# Patient Record
Sex: FEMALE | URBAN - METROPOLITAN AREA
[De-identification: names, ages, dates, MRNs, and addresses within clinical notes are randomized per-mention and may not be internally consistent; named-entity substitution may affect disease eponyms.]

---

## 2021-01-12 ENCOUNTER — OFFICE VISIT (OUTPATIENT)
Dept: URBAN - METROPOLITAN AREA CLINIC 113 | Facility: CLINIC | Age: 31
End: 2021-01-12
Payer: COMMERCIAL

## 2021-01-12 ENCOUNTER — WEB ENCOUNTER (OUTPATIENT)
Dept: URBAN - METROPOLITAN AREA CLINIC 13 | Facility: CLINIC | Age: 31
End: 2021-01-12

## 2021-01-12 ENCOUNTER — WEB ENCOUNTER (OUTPATIENT)
Dept: URBAN - METROPOLITAN AREA CLINIC 113 | Facility: CLINIC | Age: 31
End: 2021-01-12

## 2021-01-12 ENCOUNTER — LAB OUTSIDE AN ENCOUNTER (OUTPATIENT)
Dept: URBAN - METROPOLITAN AREA CLINIC 113 | Facility: CLINIC | Age: 31
End: 2021-01-12

## 2021-01-12 VITALS
WEIGHT: 82 LBS | SYSTOLIC BLOOD PRESSURE: 98 MMHG | HEIGHT: 55 IN | HEART RATE: 72 BPM | BODY MASS INDEX: 18.97 KG/M2 | RESPIRATION RATE: 18 BRPM | DIASTOLIC BLOOD PRESSURE: 53 MMHG | TEMPERATURE: 98.4 F

## 2021-01-12 DIAGNOSIS — R16.0 LIVER MASS: ICD-10-CM

## 2021-01-12 DIAGNOSIS — R79.89 ELEVATED LIVER FUNCTION TESTS: ICD-10-CM

## 2021-01-12 DIAGNOSIS — Q87.19 NOONAN SYNDROME: ICD-10-CM

## 2021-01-12 DIAGNOSIS — Z86.2 HISTORY OF IRON DEFICIENCY ANEMIA: ICD-10-CM

## 2021-01-12 PROCEDURE — G9903 PT SCRN TBCO ID AS NON USER: HCPCS | Performed by: INTERNAL MEDICINE

## 2021-01-12 PROCEDURE — 99204 OFFICE O/P NEW MOD 45 MIN: CPT | Performed by: INTERNAL MEDICINE

## 2021-01-12 PROCEDURE — G8420 CALC BMI NORM PARAMETERS: HCPCS | Performed by: INTERNAL MEDICINE

## 2021-01-12 PROCEDURE — G8484 FLU IMMUNIZE NO ADMIN: HCPCS | Performed by: INTERNAL MEDICINE

## 2021-01-12 PROCEDURE — G8427 DOCREV CUR MEDS BY ELIG CLIN: HCPCS | Performed by: INTERNAL MEDICINE

## 2021-01-12 NOTE — HPI-TODAY'S VISIT:
Very pleasant30-year-old female presents with a history of a liver lesion identified during her workup for a possible uterine mass.  The CT scan revealed a 1.6 cm lesion in the dome of her liver.  Also she was found to have a slight elevation of her AST.  She doesn't have any significant GI symptoms.  No complaints of dysphagia heartburn or regurgitation.  No abdominal pain.  No diarrhea or constipation.  No rectal bleeding or melena.  No pre-existing history of liver disease.  She does not drink alcohol in excess.  She has never used drugs. . CT scan reveals a 1.6 cm edematous area in the dome of the liver.  Right side.  MRI was recommended.  December 2020.

## 2021-02-02 ENCOUNTER — TELEPHONE ENCOUNTER (OUTPATIENT)
Dept: URBAN - METROPOLITAN AREA CLINIC 113 | Facility: CLINIC | Age: 31
End: 2021-02-02

## 2021-02-11 ENCOUNTER — TELEPHONE ENCOUNTER (OUTPATIENT)
Dept: URBAN - METROPOLITAN AREA CLINIC 113 | Facility: CLINIC | Age: 31
End: 2021-02-11

## 2021-04-06 ENCOUNTER — LAB OUTSIDE AN ENCOUNTER (OUTPATIENT)
Dept: URBAN - METROPOLITAN AREA CLINIC 113 | Facility: CLINIC | Age: 31
End: 2021-04-06

## 2021-04-06 ENCOUNTER — OFFICE VISIT (OUTPATIENT)
Dept: URBAN - METROPOLITAN AREA CLINIC 113 | Facility: CLINIC | Age: 31
End: 2021-04-06
Payer: COMMERCIAL

## 2021-04-06 ENCOUNTER — WEB ENCOUNTER (OUTPATIENT)
Dept: URBAN - METROPOLITAN AREA CLINIC 113 | Facility: CLINIC | Age: 31
End: 2021-04-06

## 2021-04-06 VITALS
HEART RATE: 71 BPM | WEIGHT: 83 LBS | HEIGHT: 55 IN | RESPIRATION RATE: 18 BRPM | SYSTOLIC BLOOD PRESSURE: 117 MMHG | DIASTOLIC BLOOD PRESSURE: 70 MMHG | BODY MASS INDEX: 19.21 KG/M2 | TEMPERATURE: 98.1 F

## 2021-04-06 DIAGNOSIS — R79.89 ELEVATED LIVER FUNCTION TESTS: ICD-10-CM

## 2021-04-06 DIAGNOSIS — Z86.2 HISTORY OF IRON DEFICIENCY ANEMIA: ICD-10-CM

## 2021-04-06 DIAGNOSIS — R76.8 JO-1 ANTIBODY POSITIVE: ICD-10-CM

## 2021-04-06 DIAGNOSIS — R16.0 LIVER MASS: ICD-10-CM

## 2021-04-06 DIAGNOSIS — Q87.19 NOONAN SYNDROME: ICD-10-CM

## 2021-04-06 DIAGNOSIS — Q21.1 PATENT FORAMEN OVALE: ICD-10-CM

## 2021-04-06 PROCEDURE — 99214 OFFICE O/P EST MOD 30 MIN: CPT | Performed by: INTERNAL MEDICINE

## 2021-04-06 RX ORDER — DOCUSATE SODIUM 100 MG/1
1 CAPSULE AS NEEDED CAPSULE ORAL ONCE A DAY
Status: ACTIVE | COMMUNITY

## 2021-04-06 RX ORDER — ESCITALOPRAM OXALATE 10 MG/1
1 TABLET TABLET, FILM COATED ORAL ONCE A DAY
Status: ACTIVE | COMMUNITY

## 2021-04-06 RX ORDER — NORETHINDRONE ACETATE AND ETHINYL ESTRADIOL .02; 1 MG/1; MG/1
1 TABLET TABLET ORAL ONCE A DAY
Status: ACTIVE | COMMUNITY

## 2021-04-06 NOTE — HPI-TODAY'S VISIT:
Very pleasant 30-year-old female presents with a history of a liver lesion identified during her workup for a possible uterine mass.  The CT scan revealed a 1.6 cm lesion in the dome of her liver.  Also she was found to have a slight elevation of her AST.  . She does not drink alcohol in excess.  She has never used drugs. . She has mild fatigue.  But no significant GI symptoms.  No dysphagia heartburn or regurgitation.  No abdominal pain.  She never got MRI done because her office did not call her.  No diarrhea or constipation.  No rectal bleeding or melena.  We have had difficulty getting her and with her rheumatologist.  Finally we have identified at [facility] will accept her insurance.  She has an appointment in the near future.  , alkaline phosphatase 37, ALT 24, bilirubin 0.5.  Hemoglobin 14.0.  Anti-Lexi 1 antibody was slightly positive.  Etiology undetermined.  MORENO was positive but there was no titer assessed.  Hepatitis C and hepatitis B DNA were negative.  Ferritin 13.  Anti-smooth muscle antibody and antimitochondrial antibodies were negative.  Scleroderma antibody and Sjogren's antibodies were negative. . Echocardiogram February 2021.  Right atrial pressure was normal.  Tricuspid and pulmonic valve was normal.  There was a moderate right to left shunt.  [facility]: Cardiology about this and they felt that it was consistent with a fairly patent foramen ovale. . CT scan reveals a 1.6 cm edematous area in the dome of the liver suggestive of focal nodular hyperplasia.  Also there was a suggestion of some degree of hepatic venous congestion on that study. MRI was recommended.  December 2020.

## 2021-04-13 LAB
A/G RATIO: 1.4
AFP, SERUM, TUMOR MARKER: 1.7
ALBUMIN: 3.8
ALDOLASE: 7.5
ALKALINE PHOSPHATASE: 36
ALT (SGPT): 10
ANA DIRECT: POSITIVE
AST (SGOT): 176
BILIRUBIN, TOTAL: 0.3
BUN/CREATININE RATIO: 18
BUN: 10
C-REACTIVE PROTEIN, QUANT: 1
CALCIUM: 8.7
CARBON DIOXIDE, TOTAL: 22
CHLORIDE: 106
CK-BB: 0
CK-MB: 0
CK-MM: 100
CREATINE KINASE,TOTAL: 38
CREATININE: 0.55
EGFR IF AFRICN AM: 148
EGFR IF NONAFRICN AM: 128
GLOBULIN, TOTAL: 2.8
GLUCOSE: 86
HAPTOGLOBIN: 33
HEMATOCRIT: 42.2
HEMOGLOBIN: 13.4
LDH: 156
MACRO TYPE 1: 0
MACRO TYPE 2: 0
MCH: 28.2
MCHC: 31.8
MCV: 89
NRBC: (no result)
PLATELETS: 223
POTASSIUM: 4.1
PROTEIN, TOTAL: 6.6
RBC: 4.75
RDW: 12.1
RETICULOCYTE COUNT: 1.1
SEDIMENTATION RATE-WESTERGREN: 5
SODIUM: 139
WBC: 7.7

## 2021-07-08 ENCOUNTER — OFFICE VISIT (OUTPATIENT)
Dept: URBAN - METROPOLITAN AREA CLINIC 113 | Facility: CLINIC | Age: 31
End: 2021-07-08

## 2021-07-29 ENCOUNTER — OFFICE VISIT (OUTPATIENT)
Dept: URBAN - METROPOLITAN AREA CLINIC 113 | Facility: CLINIC | Age: 31
End: 2021-07-29
Payer: COMMERCIAL

## 2021-07-29 ENCOUNTER — WEB ENCOUNTER (OUTPATIENT)
Dept: URBAN - METROPOLITAN AREA CLINIC 113 | Facility: CLINIC | Age: 31
End: 2021-07-29

## 2021-07-29 VITALS
DIASTOLIC BLOOD PRESSURE: 65 MMHG | TEMPERATURE: 97.9 F | HEART RATE: 71 BPM | WEIGHT: 80 LBS | RESPIRATION RATE: 18 BRPM | SYSTOLIC BLOOD PRESSURE: 108 MMHG | HEIGHT: 55 IN | BODY MASS INDEX: 18.52 KG/M2

## 2021-07-29 DIAGNOSIS — R76.8 POSITIVE ANA (ANTINUCLEAR ANTIBODY): ICD-10-CM

## 2021-07-29 DIAGNOSIS — R79.89 ELEVATED LIVER FUNCTION TESTS: ICD-10-CM

## 2021-07-29 DIAGNOSIS — K21.9 GERD: ICD-10-CM

## 2021-07-29 PROCEDURE — 99214 OFFICE O/P EST MOD 30 MIN: CPT | Performed by: INTERNAL MEDICINE

## 2021-07-29 RX ORDER — PANTOPRAZOLE SODIUM 40 MG/1
TAKE 1 TABLET DAILY TABLET, DELAYED RELEASE ORAL ONCE A DAY
Qty: 90 | Refills: 3 | OUTPATIENT
Start: 2021-07-29

## 2021-07-29 RX ORDER — DOCUSATE SODIUM 100 MG/1
1 CAPSULE AS NEEDED CAPSULE ORAL ONCE A DAY
Status: ACTIVE | COMMUNITY

## 2021-07-29 RX ORDER — NORETHINDRONE ACETATE AND ETHINYL ESTRADIOL .02; 1 MG/1; MG/1
1 TABLET TABLET ORAL ONCE A DAY
Status: ACTIVE | COMMUNITY

## 2021-07-29 RX ORDER — ESCITALOPRAM OXALATE 10 MG/1
1 TABLET TABLET, FILM COATED ORAL ONCE A DAY
Status: ACTIVE | COMMUNITY

## 2021-07-29 NOTE — HPI-OTHER HISTORIES
, alkaline phosphatase 37, ALT 24, bilirubin 0.5.  Hemoglobin 14.0.  Anti-Lexi 1 antibody was slightly positive.  Etiology undetermined.  MORENO was positive but there was no titer assessed.  Hepatitis C and hepatitis B DNA were negative.  Ferritin 13.  Anti-smooth muscle antibody and antimitochondrial antibodies were negative.  Scleroderma antibody and Sjogren's antibodies were negative. Echocardiogram February 2021.  Right atrial pressure was normal.  Tricuspid and pulmonic valve was normal.  There was a moderate right to left shunt.  [facility]: Cardiology about this and they felt that it was consistent with a fairly patent foramen ovale. CT scan reveals a 1.6 cm edematous area in the dome of the liver suggestive of focal nodular hyperplasia.  Also there was a suggestion of some degree of hepatic venous congestion on that study. MRI was recommended.  December 2020.

## 2021-08-02 ENCOUNTER — TELEPHONE ENCOUNTER (OUTPATIENT)
Dept: URBAN - METROPOLITAN AREA CLINIC 113 | Facility: CLINIC | Age: 31
End: 2021-08-02

## 2021-08-18 ENCOUNTER — TELEPHONE ENCOUNTER (OUTPATIENT)
Dept: URBAN - METROPOLITAN AREA CLINIC 113 | Facility: CLINIC | Age: 31
End: 2021-08-18

## 2021-09-23 ENCOUNTER — OFFICE VISIT (OUTPATIENT)
Dept: URBAN - METROPOLITAN AREA CLINIC 113 | Facility: CLINIC | Age: 31
End: 2021-09-23

## 2021-12-06 ENCOUNTER — OFFICE VISIT (OUTPATIENT)
Dept: URBAN - METROPOLITAN AREA CLINIC 113 | Facility: CLINIC | Age: 31
End: 2021-12-06

## 2021-12-07 ENCOUNTER — OFFICE VISIT (OUTPATIENT)
Dept: URBAN - METROPOLITAN AREA CLINIC 113 | Facility: CLINIC | Age: 31
End: 2021-12-07
Payer: COMMERCIAL

## 2021-12-07 VITALS
SYSTOLIC BLOOD PRESSURE: 103 MMHG | HEIGHT: 55 IN | DIASTOLIC BLOOD PRESSURE: 64 MMHG | TEMPERATURE: 98.6 F | HEART RATE: 75 BPM | BODY MASS INDEX: 40.96 KG/M2 | WEIGHT: 177 LBS

## 2021-12-07 DIAGNOSIS — Q87.19 NOONAN SYNDROME: ICD-10-CM

## 2021-12-07 DIAGNOSIS — R76.8 JO-1 ANTIBODY POSITIVE: ICD-10-CM

## 2021-12-07 DIAGNOSIS — R79.89 ELEVATED LIVER FUNCTION TESTS: ICD-10-CM

## 2021-12-07 DIAGNOSIS — K76.89 FOCAL NODULAR HYPERPLASIA OF LIVER: ICD-10-CM

## 2021-12-07 PROCEDURE — 99214 OFFICE O/P EST MOD 30 MIN: CPT | Performed by: INTERNAL MEDICINE

## 2021-12-07 RX ORDER — NORETHINDRONE ACETATE AND ETHINYL ESTRADIOL .03; 1.5 MG/1; MG/1
1 TABLET TABLET ORAL ONCE A DAY
Status: ACTIVE | COMMUNITY

## 2021-12-07 RX ORDER — PANTOPRAZOLE SODIUM 40 MG/1
TAKE 1 TABLET DAILY TABLET, DELAYED RELEASE ORAL ONCE A DAY
Qty: 90 | Refills: 3 | Status: ACTIVE | COMMUNITY
Start: 2021-07-29

## 2021-12-07 RX ORDER — ESCITALOPRAM OXALATE 10 MG/1
1 TABLET TABLET, FILM COATED ORAL ONCE A DAY
Status: ACTIVE | COMMUNITY

## 2021-12-07 NOTE — HPI-TODAY'S VISIT:
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 She was last seen 4/6/2021 for follow-up regarding a liver lesion, incidentally noted while undergoing a work-up for a possible uterine mass.  Her AST was markedly elevated, but ALT and ALP were normal.  Extensive work-up revealed a positive MORENO and anti-Lexi 1 antibody, of unclear significance.  She was encouraged to follow-up with rheumatology as planned at [facility], additional labs, a MRI, and Doppler ultrasound of the abdomen were planned. . MRI of the abdomen with and without contrast 5/12/2021 showed benign focal nodular hyperplasia within segment II of the liver measuring up to 1.7 cm which corresponds to the lesion seen on prior imaging.  No suspicious or concerning liver lesion identified.  Linear reticulation within the periphery of the liver and most pronounced within the right hepatic lobe which may reflect changes of hepatic congestion or fibrosis.  Ultrasound liver Doppler 5/12/2021:A 1.7 x 1.2 x 1.7 cm hyperechoic, round lesion in the left hepatic lobe.  Findings may represent a FNH, hemangioma versus hepatic adenoma. . She saw [facility] in May, at which time prior lab findings, including MORENO, were suspected to be false positive. No further work-up was recommended, and she was not given a follow-up visit. . At the last visit, she was recommended to seek another opinion from [facility]. . She is doing well from a GI standpoint. She denies abdominal pain, change in bowel habits, melena or hematochezia. She occasionally has constipation.  Labs 11/18/2021 noted normal CBC, , ALT 12, ALP 47, TB 0.5, Alb 4.3.  She has an appointment with [facility] on Thursday this week.  . , alkaline phosphatase 37, ALT 24, bilirubin 0.5.  Hemoglobin 14.0.  Anti-Lexi 1 antibody was slightly positive.  Etiology undetermined.  MORENO was positive but there was no titer assessed.  Hepatitis C and hepatitis B DNA were negative.  Ferritin 13.  Anti-smooth muscle antibody and antimitochondrial antibodies were negative.  Scleroderma antibody and Sjogren's antibodies were negative. . Echocardiogram February 2021.  Right atrial pressure was normal.  Tricuspid and pulmonic valve was normal.  There was a moderate right to left shunt.  [facility]: Cardiology about this and they felt that it was consistent with a fairly patent foramen ovale. . CT scan reveals a 1.6 cm edematous area in the dome of the liver suggestive of focal nodular hyperplasia.  Also there was a suggestion of some degree of hepatic venous congestion on that study. MRI was recommended.  December 2020.

## 2022-04-12 ENCOUNTER — OFFICE VISIT (OUTPATIENT)
Dept: URBAN - METROPOLITAN AREA CLINIC 113 | Facility: CLINIC | Age: 32
End: 2022-04-12
Payer: COMMERCIAL

## 2022-04-12 VITALS
TEMPERATURE: 98.4 F | WEIGHT: 76 LBS | RESPIRATION RATE: 20 BRPM | BODY MASS INDEX: 17.59 KG/M2 | DIASTOLIC BLOOD PRESSURE: 77 MMHG | SYSTOLIC BLOOD PRESSURE: 121 MMHG | HEIGHT: 55 IN | HEART RATE: 65 BPM

## 2022-04-12 DIAGNOSIS — R79.89 ELEVATED LIVER FUNCTION TESTS: ICD-10-CM

## 2022-04-12 DIAGNOSIS — R51.9 HEADACHE, UNSPECIFIED: ICD-10-CM

## 2022-04-12 DIAGNOSIS — K76.89 FOCAL NODULAR HYPERPLASIA OF LIVER: ICD-10-CM

## 2022-04-12 DIAGNOSIS — K59.01 SLOW TRANSIT CONSTIPATION: ICD-10-CM

## 2022-04-12 PROCEDURE — 99214 OFFICE O/P EST MOD 30 MIN: CPT | Performed by: INTERNAL MEDICINE

## 2022-04-12 RX ORDER — ESCITALOPRAM OXALATE 10 MG/1
1 TABLET TABLET, FILM COATED ORAL ONCE A DAY
Status: ACTIVE | COMMUNITY

## 2022-04-12 RX ORDER — NORETHINDRONE ACETATE AND ETHINYL ESTRADIOL .03; 1.5 MG/1; MG/1
1 TABLET TABLET ORAL ONCE A DAY
Status: ON HOLD | COMMUNITY

## 2022-04-12 RX ORDER — PANTOPRAZOLE SODIUM 40 MG/1
TAKE 1 TABLET DAILY TABLET, DELAYED RELEASE ORAL ONCE A DAY
Qty: 90 | Refills: 3 | Status: ON HOLD | COMMUNITY
Start: 2021-07-29

## 2022-04-12 RX ORDER — LEVOTHYROXINE SODIUM 50 UG/1
1 TABLET IN THE MORNING ON AN EMPTY STOMACH TABLET ORAL ONCE A DAY
Status: ACTIVE | COMMUNITY

## 2022-04-12 NOTE — HPI-TODAY'S VISIT:
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  She was seen in the office in December 2021 for follow-up regarding a persistent AST elevation with positive MORENO and anti-Lexi 1 antibody of unclear clinical significance.  She was to follow-up with [facility] as planned later that week for a second opinion. Regarding FNH, alternative contraceptive methods were considered.  She saw Dr. Orlando in December, at which time labs showed an improvement in her AST to 195. Her LFTs were otherwise normal and she had negative MORENO, anti Lexi-1, ASMA. Additional labs and MRI with [facility] were planned. This reportedly showed multiple liver lesions, 3 FNH and one indeterminate. Her oral contraceptive was discontinued and she had an IUD placed. Repeat MRI is recommended in 1 year for surveillance of her liver lesion. Liver biopsy was deferred.  She has been experiencing frequent headaches for months now, requiring the use of Advil at least a few times per week. She often has nausea associated with the headaches, which responds to ondansetron when needed. She has not vomited. She denies abdominal pain. She does complain of constipation, only having one bowel movement per week despite stool softeners. She denies blood per rectum.

## 2022-04-12 NOTE — HPI-OTHER HISTORIES
Labs 11/18/2021 noted normal CBC, , ALT 12, ALP 47, TB 0.5, Alb 4.3.    She saw [facility] in May 2021, at which time prior lab findings, including MORENO, were suspected to be false positive. No further work-up was recommended, and she was not given a follow-up visit. She was ultimately referred for a second opinion with [facility].  MRI of the abdomen with and without contrast 5/12/2021 showed benign focal nodular hyperplasia within segment II of the liver measuring up to 1.7 cm which corresponds to the lesion seen on prior imaging.  No suspicious or concerning liver lesion identified.  Linear reticulation within the periphery of the liver and most pronounced within the right hepatic lobe which may reflect changes of hepatic congestion or fibrosis.  Ultrasound liver Doppler 5/12/2021:A 1.7 x 1.2 x 1.7 cm hyperechoic, round lesion in the left hepatic lobe.  Findings may represent a FNH, hemangioma versus hepatic adenoma.  , alkaline phosphatase 37, ALT 24, bilirubin 0.5.  Hemoglobin 14.0.  Anti-Lexi 1 antibody was slightly positive.  Etiology undetermined.  MORENO was positive but there was no titer assessed.  Hepatitis C and hepatitis B DNA were negative.  Ferritin 13.  Anti-smooth muscle antibody and antimitochondrial antibodies were negative.  Scleroderma antibody and Sjogren's antibodies were negative. Echocardiogram February 2021.  Right atrial pressure was normal.  Tricuspid and pulmonic valve was normal.  There was a moderate right to left shunt.  [facility]: Cardiology about this and they felt that it was consistent with a fairly patent foramen ovale. CT scan reveals a 1.6 cm edematous area in the dome of the liver suggestive of focal nodular hyperplasia.  Also there was a suggestion of some degree of hepatic venous congestion on that study. MRI was recommended.  December 2020.

## 2022-04-17 LAB
A/G RATIO: 1.6
ALBUMIN: 4.7
ALKALINE PHOSPHATASE: 68
ALT (SGPT): 14
AST (SGOT): 115
BASO (ABSOLUTE): 0.1
BASOS: 1
BILIRUBIN, TOTAL: 0.4
BUN/CREATININE RATIO: 18
BUN: 10
CALCIUM: 9.4
CARBON DIOXIDE, TOTAL: 19
CHLORIDE: 106
CREATINE KINASE (CK), MB: 1
CREATININE: 0.57
EGFR: 126
EOS (ABSOLUTE): 0.4
EOS: 5
GLOBULIN, TOTAL: 3
GLUCOSE: 72
HEMATOCRIT: 44.4
HEMATOLOGY COMMENTS:: (no result)
HEMOGLOBIN: 14.7
IMMATURE CELLS: (no result)
IMMATURE GRANS (ABS): 0
IMMATURE GRANULOCYTES: 0
IMMUNOGLOBULIN A, QN, SERUM: 341
IMMUNOGLOBULIN E, TOTAL: 22
IMMUNOGLOBULIN G, QN, SERUM: 1345
IMMUNOGLOBULIN M, QN, SERUM: 322
LYMPHS (ABSOLUTE): 1.8
LYMPHS: 25
MCH: 28.2
MCHC: 33.1
MCV: 85
MONOCYTES(ABSOLUTE): 0.6
MONOCYTES: 8
NEUTROPHILS (ABSOLUTE): 4.5
NEUTROPHILS: 61
NRBC: (no result)
PLATELETS: 251
POTASSIUM: 4.3
PROTEIN, TOTAL: 7.7
RBC: 5.22
RDW: 13.3
SODIUM: 141
WBC: 7.4

## 2022-10-18 ENCOUNTER — DASHBOARD ENCOUNTERS (OUTPATIENT)
Age: 32
End: 2022-10-18

## 2022-10-18 ENCOUNTER — CLAIMS CREATED FROM THE CLAIM WINDOW (OUTPATIENT)
Dept: URBAN - METROPOLITAN AREA CLINIC 113 | Facility: CLINIC | Age: 32
End: 2022-10-18
Payer: COMMERCIAL

## 2022-10-18 ENCOUNTER — LAB OUTSIDE AN ENCOUNTER (OUTPATIENT)
Dept: URBAN - METROPOLITAN AREA CLINIC 113 | Facility: CLINIC | Age: 32
End: 2022-10-18

## 2022-10-18 VITALS
WEIGHT: 71.5 LBS | BODY MASS INDEX: 16.55 KG/M2 | RESPIRATION RATE: 20 BRPM | DIASTOLIC BLOOD PRESSURE: 58 MMHG | TEMPERATURE: 97.8 F | SYSTOLIC BLOOD PRESSURE: 92 MMHG | HEIGHT: 55 IN | HEART RATE: 77 BPM

## 2022-10-18 DIAGNOSIS — R76.8 JO-1 ANTIBODY POSITIVE: ICD-10-CM

## 2022-10-18 DIAGNOSIS — Q87.19 NOONAN SYNDROME: ICD-10-CM

## 2022-10-18 DIAGNOSIS — K76.89 FOCAL NODULAR HYPERPLASIA OF LIVER: ICD-10-CM

## 2022-10-18 DIAGNOSIS — Z86.2 HISTORY OF IRON DEFICIENCY ANEMIA: ICD-10-CM

## 2022-10-18 DIAGNOSIS — K59.01 SLOW TRANSIT CONSTIPATION: ICD-10-CM

## 2022-10-18 DIAGNOSIS — R79.89 ELEVATED LIVER FUNCTION TESTS: ICD-10-CM

## 2022-10-18 DIAGNOSIS — K21.9 GERD: ICD-10-CM

## 2022-10-18 DIAGNOSIS — Q21.1 PATENT FORAMEN OVALE: ICD-10-CM

## 2022-10-18 PROBLEM — 235595009 GASTROESOPHAGEAL REFLUX DISEASE: Status: ACTIVE | Noted: 2021-07-29

## 2022-10-18 PROBLEM — 863927004: Status: ACTIVE | Noted: 2021-01-12

## 2022-10-18 PROBLEM — 35298007 SLOW TRANSIT CONSTIPATION: Status: ACTIVE | Noted: 2022-04-12

## 2022-10-18 PROBLEM — 205824006: Status: ACTIVE | Noted: 2021-01-12

## 2022-10-18 PROBLEM — 278527001 FOCAL NODULAR HYPERPLASIA OF LIVER: Status: ACTIVE | Noted: 2021-05-18

## 2022-10-18 PROBLEM — 310286009: Status: ACTIVE | Noted: 2021-02-02

## 2022-10-18 PROBLEM — 161456009: Status: ACTIVE | Noted: 2021-01-12

## 2022-10-18 PROCEDURE — 99214 OFFICE O/P EST MOD 30 MIN: CPT | Performed by: INTERNAL MEDICINE

## 2022-10-18 RX ORDER — ONDANSETRON 4 MG/1
1 TABLET ON THE TONGUE AND ALLOW TO DISSOLVE TABLET, ORALLY DISINTEGRATING ORAL ONCE A DAY
Status: ACTIVE | COMMUNITY

## 2022-10-18 RX ORDER — LEVOTHYROXINE SODIUM 50 UG/1
1 TABLET IN THE MORNING ON AN EMPTY STOMACH TABLET ORAL ONCE A DAY
Status: ACTIVE | COMMUNITY

## 2022-10-18 RX ORDER — ESCITALOPRAM OXALATE 10 MG/1
1 TABLET TABLET, FILM COATED ORAL ONCE A DAY
Status: ACTIVE | COMMUNITY

## 2022-10-18 RX ORDER — PANTOPRAZOLE SODIUM 40 MG/1
TAKE 1 TABLET DAILY TABLET, DELAYED RELEASE ORAL ONCE A DAY
Qty: 90 | Refills: 3 | Status: ACTIVE | COMMUNITY
Start: 2021-07-29

## 2022-10-18 RX ORDER — AMITRIPTYLINE HYDROCHLORIDE 100 MG/1
1 TABLET AT BEDTIME TABLET, FILM COATED ORAL ONCE A DAY
Status: ACTIVE | COMMUNITY

## 2022-10-18 RX ORDER — NORETHINDRONE ACETATE AND ETHINYL ESTRADIOL .03; 1.5 MG/1; MG/1
1 TABLET TABLET ORAL ONCE A DAY
Status: ACTIVE | COMMUNITY

## 2022-10-18 NOTE — HPI-OTHER HISTORIES
Labs August 2022.  , ALT 14, alkaline phosphatase 60, total bilirubin 0.2.  Hemoglobin 13.3, platelet count 225,000. . Labs 11/18/2021 noted normal CBC, , ALT 12, ALP 47, TB 0.5, Alb 4.3.   . MRI of the abdomen with and without contrast 5/12/2021 showed benign focal nodular hyperplasia within segment II of the liver measuring up to 1.7 cm which corresponds to the lesion seen on prior imaging.  No suspicious or concerning liver lesion identified.  Linear reticulation within the periphery of the liver and most pronounced within the right hepatic lobe which may reflect changes of hepatic congestion or fibrosis. . Ultrasound liver Doppler 5/12/2021:A 1.7 x 1.2 x 1.7 cm hyperechoic, round lesion in the left hepatic lobe.  Findings may represent a FNH, hemangioma versus hepatic adenoma. . , alkaline phosphatase 37, ALT 24, bilirubin 0.5.  Hemoglobin 14.0.  Anti-Lexi 1 antibody was slightly positive.  Etiology undetermined.  MORENO was positive but there was no titer assessed.  Hepatitis C and hepatitis B DNA were negative.  Ferritin 13.  Anti-smooth muscle antibody and antimitochondrial antibodies were negative.  Scleroderma antibody and Sjogren's antibodies were negative. . Echocardiogram February 2021.  Right atrial pressure was normal.  Tricuspid and pulmonic valve was normal.  There was a moderate right to left shunt.  [facility]: Cardiology about this and they felt that it was consistent with a patent foramen ovale. . CT scan 2020 reveals a 1.6 cm edematous area in the dome of the liver suggestive of focal nodular hyperplasia.  Also there was a suggestion of some degree of hepatic venous congestion on that study.

## 2022-10-18 NOTE — HPI-TODAY'S VISIT:
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 . She is currently without significant GI symptoms.  No abdominal pain or diarrhea.  She does have chronic migraines.  Working with her primary care doctor about this problem.  Recently started on nortriptyline for this issue. . She was seen in the office in December 2021 for follow-up regarding a persistent AST elevation with positive MORENO and anti-Lexi 1 antibody of unclear clinical significance.  She was to follow-up with [facility] as planned later that week for a second opinion. Regarding FNH, alternative contraceptive methods were considered. She now uses an IUD. . She saw Dr. Orlando in December, at which time labs showed an improvement in her AST to 195. Her LFTs were otherwise normal and she had negative MORENO, anti Lexi-1, ASMA. Additional labs and MRI with [facility] were planned. This reportedly showed multiple liver lesions, 3 FNH and one indeterminate. Her oral contraceptive was discontinued and she had an IUD placed. Repeat MRI is recommended in 1 year for surveillance of her liver lesion. Liver biopsy was deferred. . She saw [facility] in May 2021, at which time prior lab findings, including MORENO, were suspected to be false positive. No further work-up was recommended, and she was not given a follow-up visit.